# Patient Record
Sex: MALE | Race: WHITE | NOT HISPANIC OR LATINO | Employment: FULL TIME | URBAN - METROPOLITAN AREA
[De-identification: names, ages, dates, MRNs, and addresses within clinical notes are randomized per-mention and may not be internally consistent; named-entity substitution may affect disease eponyms.]

---

## 2020-03-05 ENCOUNTER — APPOINTMENT (OUTPATIENT)
Dept: CT IMAGING | Facility: CLINIC | Age: 45
End: 2020-03-05
Attending: EMERGENCY MEDICINE

## 2020-03-05 ENCOUNTER — HOSPITAL ENCOUNTER (EMERGENCY)
Facility: CLINIC | Age: 45
Discharge: HOME | End: 2020-03-05
Attending: EMERGENCY MEDICINE | Admitting: EMERGENCY MEDICINE

## 2020-03-05 VITALS
TEMPERATURE: 97.4 F | HEART RATE: 78 BPM | SYSTOLIC BLOOD PRESSURE: 124 MMHG | RESPIRATION RATE: 12 BRPM | OXYGEN SATURATION: 99 % | DIASTOLIC BLOOD PRESSURE: 83 MMHG | WEIGHT: 165.8 LBS

## 2020-03-05 DIAGNOSIS — K76.9 LIVER LESION: ICD-10-CM

## 2020-03-05 DIAGNOSIS — R10.84 GENERALIZED ABDOMINAL PAIN: ICD-10-CM

## 2020-03-05 LAB
ALBUMIN SERPL-MCNC: 4 G/DL (ref 3.4–5)
ALBUMIN UR-MCNC: NEGATIVE MG/DL
ALP SERPL-CCNC: 92 U/L (ref 40–150)
ALT SERPL W P-5'-P-CCNC: 27 U/L (ref 0–70)
ANION GAP SERPL CALCULATED.3IONS-SCNC: 4 MMOL/L (ref 3–14)
APPEARANCE UR: CLEAR
AST SERPL W P-5'-P-CCNC: 19 U/L (ref 0–45)
BASOPHILS # BLD AUTO: 0 10E9/L (ref 0–0.2)
BASOPHILS NFR BLD AUTO: 0.1 %
BILIRUB SERPL-MCNC: 0.8 MG/DL (ref 0.2–1.3)
BILIRUB UR QL STRIP: NEGATIVE
BUN SERPL-MCNC: 13 MG/DL (ref 7–30)
CALCIUM SERPL-MCNC: 8.8 MG/DL (ref 8.5–10.1)
CHLORIDE SERPL-SCNC: 110 MMOL/L (ref 94–109)
CO2 SERPL-SCNC: 28 MMOL/L (ref 20–32)
COLOR UR AUTO: ABNORMAL
CREAT SERPL-MCNC: 0.88 MG/DL (ref 0.66–1.25)
DIFFERENTIAL METHOD BLD: ABNORMAL
EOSINOPHIL # BLD AUTO: 0.1 10E9/L (ref 0–0.7)
EOSINOPHIL NFR BLD AUTO: 1.7 %
ERYTHROCYTE [DISTWIDTH] IN BLOOD BY AUTOMATED COUNT: 12.9 % (ref 10–15)
GFR SERPL CREATININE-BSD FRML MDRD: >90 ML/MIN/{1.73_M2}
GLUCOSE SERPL-MCNC: 87 MG/DL (ref 70–99)
GLUCOSE UR STRIP-MCNC: NEGATIVE MG/DL
HCT VFR BLD AUTO: 43.4 % (ref 40–53)
HGB BLD-MCNC: 15.1 G/DL (ref 13.3–17.7)
HGB UR QL STRIP: NEGATIVE
IMM GRANULOCYTES # BLD: 0 10E9/L (ref 0–0.4)
IMM GRANULOCYTES NFR BLD: 0.1 %
KETONES UR STRIP-MCNC: NEGATIVE MG/DL
LEUKOCYTE ESTERASE UR QL STRIP: NEGATIVE
LIPASE SERPL-CCNC: 165 U/L (ref 73–393)
LYMPHOCYTES # BLD AUTO: 2.2 10E9/L (ref 0.8–5.3)
LYMPHOCYTES NFR BLD AUTO: 26.2 %
MCH RBC QN AUTO: 33.6 PG (ref 26.5–33)
MCHC RBC AUTO-ENTMCNC: 34.8 G/DL (ref 31.5–36.5)
MCV RBC AUTO: 96 FL (ref 78–100)
MONOCYTES # BLD AUTO: 0.8 10E9/L (ref 0–1.3)
MONOCYTES NFR BLD AUTO: 9.7 %
MUCOUS THREADS #/AREA URNS LPF: PRESENT /LPF
NEUTROPHILS # BLD AUTO: 5.2 10E9/L (ref 1.6–8.3)
NEUTROPHILS NFR BLD AUTO: 62.2 %
NITRATE UR QL: NEGATIVE
NRBC # BLD AUTO: 0 10*3/UL
NRBC BLD AUTO-RTO: 0 /100
PH UR STRIP: 6.5 PH (ref 5–7)
PLATELET # BLD AUTO: 244 10E9/L (ref 150–450)
POTASSIUM SERPL-SCNC: 4 MMOL/L (ref 3.4–5.3)
PROT SERPL-MCNC: 7.3 G/DL (ref 6.8–8.8)
RBC # BLD AUTO: 4.5 10E12/L (ref 4.4–5.9)
RBC #/AREA URNS AUTO: 1 /HPF (ref 0–2)
SODIUM SERPL-SCNC: 142 MMOL/L (ref 133–144)
SOURCE: ABNORMAL
SP GR UR STRIP: >1.035 (ref 1–1.03)
UROBILINOGEN UR STRIP-MCNC: NORMAL MG/DL (ref 0–2)
WBC # BLD AUTO: 8.3 10E9/L (ref 4–11)
WBC #/AREA URNS AUTO: <1 /HPF (ref 0–5)

## 2020-03-05 PROCEDURE — 96375 TX/PRO/DX INJ NEW DRUG ADDON: CPT | Performed by: EMERGENCY MEDICINE

## 2020-03-05 PROCEDURE — 25000125 ZZHC RX 250: Performed by: EMERGENCY MEDICINE

## 2020-03-05 PROCEDURE — 25800030 ZZH RX IP 258 OP 636: Performed by: EMERGENCY MEDICINE

## 2020-03-05 PROCEDURE — 25000128 H RX IP 250 OP 636: Performed by: EMERGENCY MEDICINE

## 2020-03-05 PROCEDURE — 80053 COMPREHEN METABOLIC PANEL: CPT | Performed by: EMERGENCY MEDICINE

## 2020-03-05 PROCEDURE — 96361 HYDRATE IV INFUSION ADD-ON: CPT | Performed by: EMERGENCY MEDICINE

## 2020-03-05 PROCEDURE — 85025 COMPLETE CBC W/AUTO DIFF WBC: CPT | Performed by: EMERGENCY MEDICINE

## 2020-03-05 PROCEDURE — 96374 THER/PROPH/DIAG INJ IV PUSH: CPT | Mod: 59 | Performed by: EMERGENCY MEDICINE

## 2020-03-05 PROCEDURE — 74177 CT ABD & PELVIS W/CONTRAST: CPT

## 2020-03-05 PROCEDURE — 83690 ASSAY OF LIPASE: CPT | Performed by: EMERGENCY MEDICINE

## 2020-03-05 PROCEDURE — 99285 EMERGENCY DEPT VISIT HI MDM: CPT | Mod: Z6 | Performed by: EMERGENCY MEDICINE

## 2020-03-05 PROCEDURE — 25000132 ZZH RX MED GY IP 250 OP 250 PS 637: Performed by: EMERGENCY MEDICINE

## 2020-03-05 PROCEDURE — 25800030 ZZH RX IP 258 OP 636

## 2020-03-05 PROCEDURE — 99285 EMERGENCY DEPT VISIT HI MDM: CPT | Mod: 25 | Performed by: EMERGENCY MEDICINE

## 2020-03-05 PROCEDURE — 81001 URINALYSIS AUTO W/SCOPE: CPT | Performed by: EMERGENCY MEDICINE

## 2020-03-05 RX ORDER — IOPAMIDOL 755 MG/ML
100 INJECTION, SOLUTION INTRAVASCULAR ONCE
Status: COMPLETED | OUTPATIENT
Start: 2020-03-05 | End: 2020-03-05

## 2020-03-05 RX ORDER — ONDANSETRON 2 MG/ML
4 INJECTION INTRAMUSCULAR; INTRAVENOUS EVERY 30 MIN PRN
Status: DISCONTINUED | OUTPATIENT
Start: 2020-03-05 | End: 2020-03-05 | Stop reason: HOSPADM

## 2020-03-05 RX ORDER — SODIUM CHLORIDE 9 MG/ML
INJECTION, SOLUTION INTRAVENOUS
Status: COMPLETED
Start: 2020-03-05 | End: 2020-03-05

## 2020-03-05 RX ORDER — SODIUM CHLORIDE 9 MG/ML
1000 INJECTION, SOLUTION INTRAVENOUS CONTINUOUS
Status: DISCONTINUED | OUTPATIENT
Start: 2020-03-05 | End: 2020-03-05 | Stop reason: HOSPADM

## 2020-03-05 RX ORDER — KETOROLAC TROMETHAMINE 30 MG/ML
30 INJECTION, SOLUTION INTRAMUSCULAR; INTRAVENOUS ONCE
Status: COMPLETED | OUTPATIENT
Start: 2020-03-05 | End: 2020-03-05

## 2020-03-05 RX ORDER — MORPHINE SULFATE 4 MG/ML
4 INJECTION, SOLUTION INTRAMUSCULAR; INTRAVENOUS
Status: DISCONTINUED | OUTPATIENT
Start: 2020-03-05 | End: 2020-03-05 | Stop reason: HOSPADM

## 2020-03-05 RX ADMIN — MORPHINE SULFATE 4 MG: 4 INJECTION INTRAVENOUS at 10:20

## 2020-03-05 RX ADMIN — IOPAMIDOL 81 ML: 755 INJECTION, SOLUTION INTRAVENOUS at 11:19

## 2020-03-05 RX ADMIN — KETOROLAC TROMETHAMINE 30 MG: 30 INJECTION, SOLUTION INTRAMUSCULAR; INTRAVENOUS at 12:52

## 2020-03-05 RX ADMIN — SODIUM CHLORIDE 60 ML: 9 INJECTION, SOLUTION INTRAVENOUS at 11:20

## 2020-03-05 RX ADMIN — SODIUM CHLORIDE 1000 ML: 9 INJECTION, SOLUTION INTRAVENOUS at 10:26

## 2020-03-05 RX ADMIN — LIDOCAINE HYDROCHLORIDE 30 ML: 20 SOLUTION ORAL; TOPICAL at 12:50

## 2020-03-05 RX ADMIN — ONDANSETRON 4 MG: 2 INJECTION INTRAMUSCULAR; INTRAVENOUS at 10:20

## 2020-03-05 RX ADMIN — SODIUM CHLORIDE 1000 ML: 9 INJECTION, SOLUTION INTRAVENOUS at 09:59

## 2020-03-05 RX ADMIN — Medication 1000 ML: at 09:59

## 2020-03-05 ASSESSMENT — ENCOUNTER SYMPTOMS
HEMATURIA: 0
CONSTIPATION: 0
VOMITING: 0
CHILLS: 0
DYSURIA: 0
SHORTNESS OF BREATH: 0
FEVER: 0
APPETITE CHANGE: 1
DIARRHEA: 0
FATIGUE: 0
ABDOMINAL PAIN: 1
NAUSEA: 0
COUGH: 0

## 2020-03-05 NOTE — ED TRIAGE NOTES
"\"feels like something is going to explode on my right side\".  RUQ abdominal pain that has migrated towards center.  Pt has lack of sleep.  Described as sharp and hurts worse while sitting  "

## 2020-03-05 NOTE — ED PROVIDER NOTES
[facility] EMERGENCY DEPARTMENT ([facility])    3/05/20       History     Chief Complaint   Patient presents with     Abdominal Pain     RUQ pain     HPI  Lauri Gentile is a 44 year old male with no reported medical history who presents to the Emergency Department for right upper quadrant abdominal pain.  Reports that since Friday he has had worsening right upper quadrant abdominal pain that radiates medially to umbilicus and laterally to his flank.  Has not yet been evaluated for it.  States that pain is worse when sitting up/moving or when taking a deep breath. Feels there is an area of elevation to the right and superior to his belly button that is the source of his pain.  During any of these aggravating factors he states the pain is 10/10 in intensity and is tolerable when lying partially supine.  He states that over the past 3 days he has not been eating much.  He denies nausea, vomiting, or diarrhea.  Bowel movements have been normal.  Denies chest pain or cough.  Denies any alcohol use and denies a history of previous abdominal surgeries.  Patient states that he is otherwise healthy; does not take any other medication at this time. Denies any trauma or injury to the abdomen but does report having been elbowed while playing basketball in his lateral right chest.    I have reviewed the Medications, Allergies, Past Medical and Surgical History, and Social History in the [facility] system.  PAST MEDICAL HISTORY: History reviewed. No pertinent past medical history.    PAST SURGICAL HISTORY: No past surgical history on file.    FAMILY HISTORY: No family history on file.    SOCIAL HISTORY:   Social History     Tobacco Use     Smoking status: Not on file   Substance Use Topics     Alcohol use: Not on file       There are no discharge medications for this patient.       No Known Allergies     Review of Systems   Constitutional: Positive for appetite change. Negative for chills, fatigue and fever.    Respiratory: Negative for cough and shortness of breath.    Cardiovascular: Negative for chest pain.   Gastrointestinal: Positive for abdominal pain (RUQ). Negative for constipation, diarrhea, nausea and vomiting.   Genitourinary: Negative for dysuria and hematuria.   All other systems reviewed and are negative.      Physical Exam   BP: 123/75  Pulse: 78  Temp: 97.4  F (36.3  C)  Resp: 12  Weight: 75.2 kg (165 lb 12.8 oz)  SpO2: 98 %      Physical Exam  Vitals signs and nursing note reviewed.   Constitutional:       General: He is not in acute distress.     Appearance: He is well-developed.   HENT:      Head: Normocephalic and atraumatic.   Eyes:      General: No scleral icterus.     Conjunctiva/sclera: Conjunctivae normal.      Pupils: Pupils are equal, round, and reactive to light.   Neck:      Musculoskeletal: Neck supple.   Cardiovascular:      Rate and Rhythm: Normal rate and regular rhythm.      Heart sounds: Normal heart sounds.   Pulmonary:      Effort: Pulmonary effort is normal. No respiratory distress.      Breath sounds: Normal breath sounds. No wheezing.   Abdominal:      General: Abdomen is flat.      Palpations: Abdomen is soft.      Tenderness: There is no abdominal tenderness. Negative signs include Bustos's sign.      Hernia: No hernia is present.   Skin:     General: Skin is warm and dry.   Neurological:      Mental Status: He is alert and oriented to person, place, and time.      Cranial Nerves: No cranial nerve deficit.   Psychiatric:         Behavior: Behavior normal.         ED Course   10:11 AM  The patient was seen and examined by Niranjan Aguilar MD in Room ED07.       Procedures        CT Abdomen Pelvis w Contrast   Final Result   IMPRESSION:    1. No acute abnormality in the abdomen or pelvis. No cause for   right-sided pain is identified.   2. An enhancing 1.3 cm right hepatic lesion could represent a   hemangioma, however this lesion is not definitively characterized, and   is  considered indeterminate. Consider liver MRI for more definitive   characterization.      RADHA MONTGOMERY MD               No results found for this or any previous visit (from the past 24 hour(s)).  Medications   0.9% sodium chloride BOLUS (0 mLs Intravenous Stopped 3/5/20 1000)   iopamidol (ISOVUE-370) solution 100 mL (81 mLs Intravenous Given 3/5/20 1119)   sodium chloride 0.9 % bag 500mL for CT scan flush use (60 mLs As instructed Given 3/5/20 1120)   ketorolac (TORADOL) injection 30 mg (30 mg Intravenous Given 3/5/20 1252)   lidocaine (XYLOCAINE) 2 % 15 mL, alum & mag hydroxide-simethicone (MAALOX  ES) 15 mL GI Cocktail (30 mLs Oral Given 3/5/20 1250)             Assessments & Plan (with Medical Decision Making)   This is a 44-year-old male with no significant medical history. He has had 1 week of somewhat vague abdominal discomfort primarily in the right side and right upper abdomen. No constipation no diarrhea, no nausea or vomiting.  His work-up here shows completely normal labs including LFTs and lipase.  He has no urinary symptoms.  We did a CT of the abdomen which is normal except for small indeterminate liver lesion that is not related to his current symptoms.  Patient was frustrated that no etiology was found. I did try to reassure him that this means there is nothing serious more than likely causing the symptoms and I will recommend he follow-up with a GI provider.  He also should get a primary care provider, will give him the phone number to the [facility] clinic.    This part of the medical record was transcribed by Quynh Nunez Scribe.     I have reviewed the nursing notes.    I have reviewed the findings, diagnosis, plan and need for follow up with the patient.    There are no discharge medications for this patient.      Final diagnoses:   Generalized abdominal pain     I, Liang Delgado, am serving as a trained medical scribe to document services personally performed by Niranjan  MD Lauren, based on the provider's statements to me.   I, Niranjan Aguilar MD, was physically present and have reviewed and verified the accuracy of this note documented by Liang Delgado.     3/5/2020   [facility], [place], EMERGENCY DEPARTMENT     Niranjan Aguilar MD  03/09/20 0843

## 2020-03-05 NOTE — DISCHARGE INSTRUCTIONS
All of your tests are normal  If you continue to have discomfort I recommend you follow-up with a gastroenterologist, see the number below  Please make an appointment to follow up with GI Clinic (phone: [phone]) as soon as possible.  You should have a primary care provider, see below  Please make an appointment to follow up with [facility] (phone: [phone])     CT Abdomen Pelvis w Contrast   Final Result   IMPRESSION:    1. No acute abnormality in the abdomen or pelvis. No cause for   right-sided pain is identified.   2. An enhancing 1.3 cm right hepatic lesion could represent a   hemangioma, however this lesion is not definitively characterized, and   is considered indeterminate. Consider liver MRI for more definitive   characterization.      RADHA MONTGOMERY MD

## 2020-03-05 NOTE — ED AVS SNAPSHOT
[facility], [place], Emergency Department  [address]  [facility] [address]  Phone:  [phone]  Fax:  [phone]                                    Lauri Gentile   MRN: [id number]    Department:  [facility], Emergency Department   Date of Visit:  3/5/2020           After Visit Summary Signature Page    I have received my discharge instructions, and my questions have been answered. I have discussed any challenges I see with this plan with the nurse or doctor.    ..........................................................................................................................................  Patient/Patient Representative Signature      ..........................................................................................................................................  Patient Representative Print Name and Relationship to Patient    ..................................................               ................................................  Date                                   Time    ..........................................................................................................................................  Reviewed by Signature/Title    ...................................................              ..............................................  Date                                               Time          22EPIC Rev 08/18

## 2020-12-06 ENCOUNTER — HEALTH MAINTENANCE LETTER (OUTPATIENT)
Age: 45
End: 2020-12-06

## 2021-09-26 ENCOUNTER — HEALTH MAINTENANCE LETTER (OUTPATIENT)
Age: 46
End: 2021-09-26

## 2022-01-16 ENCOUNTER — HEALTH MAINTENANCE LETTER (OUTPATIENT)
Age: 47
End: 2022-01-16

## 2022-09-23 ENCOUNTER — ANCILLARY PROCEDURE (OUTPATIENT)
Dept: GENERAL RADIOLOGY | Facility: CLINIC | Age: 47
End: 2022-09-23
Attending: EMERGENCY MEDICINE

## 2022-09-23 ENCOUNTER — OFFICE VISIT (OUTPATIENT)
Dept: URGENT CARE | Facility: URGENT CARE | Age: 47
End: 2022-09-23
Payer: OTHER MISCELLANEOUS

## 2022-09-23 VITALS
HEIGHT: 75 IN | SYSTOLIC BLOOD PRESSURE: 111 MMHG | BODY MASS INDEX: 21.14 KG/M2 | WEIGHT: 170 LBS | HEART RATE: 69 BPM | TEMPERATURE: 98.4 F | DIASTOLIC BLOOD PRESSURE: 73 MMHG | OXYGEN SATURATION: 98 %

## 2022-09-23 DIAGNOSIS — S49.92XA SHOULDER INJURY, LEFT, INITIAL ENCOUNTER: ICD-10-CM

## 2022-09-23 DIAGNOSIS — S46.912A STRAIN OF LEFT SHOULDER, INITIAL ENCOUNTER: Primary | ICD-10-CM

## 2022-09-23 PROCEDURE — 73030 X-RAY EXAM OF SHOULDER: CPT | Mod: TC | Performed by: RADIOLOGY

## 2022-09-23 PROCEDURE — 99204 OFFICE O/P NEW MOD 45 MIN: CPT | Performed by: EMERGENCY MEDICINE

## 2022-09-26 NOTE — TELEPHONE ENCOUNTER
DIAGNOSIS: Strain of left shoulder, initial encounter \ Dr Carrera\ self   APPOINTMENT DATE: 9.27.22   NOTES STATUS DETAILS   OFFICE NOTE from other specialist Internal 9.23.22 [facility]   XRAYS (IMAGES & REPORTS) Internal 9.23.22 L shoulder

## 2022-09-27 ENCOUNTER — OFFICE VISIT (OUTPATIENT)
Dept: ORTHOPEDICS | Facility: CLINIC | Age: 47
End: 2022-09-27
Attending: EMERGENCY MEDICINE
Payer: OTHER MISCELLANEOUS

## 2022-09-27 ENCOUNTER — TELEPHONE (OUTPATIENT)
Dept: ORTHOPEDICS | Facility: CLINIC | Age: 47
End: 2022-09-27

## 2022-09-27 ENCOUNTER — PRE VISIT (OUTPATIENT)
Dept: ORTHOPEDICS | Facility: CLINIC | Age: 47
End: 2022-09-27

## 2022-09-27 VITALS — WEIGHT: 170 LBS | BODY MASS INDEX: 21.14 KG/M2 | HEIGHT: 75 IN

## 2022-09-27 DIAGNOSIS — S46.912A STRAIN OF LEFT SHOULDER, INITIAL ENCOUNTER: ICD-10-CM

## 2022-09-27 PROCEDURE — 99204 OFFICE O/P NEW MOD 45 MIN: CPT | Performed by: FAMILY MEDICINE

## 2022-09-27 RX ORDER — MELOXICAM 15 MG/1
15 TABLET ORAL DAILY PRN
Qty: 30 TABLET | Refills: 0 | Status: SHIPPED | OUTPATIENT
Start: 2022-09-27

## 2022-09-27 NOTE — PROGRESS NOTES
"CHIEF COMPLAINT:  Consult (Left shoulder)       HISTORY OF PRESENT ILLNESS  Mr. Gentile is a pleasant 46 year old male who presents to clinic today with left shoulder pain.  Mikhail injured his shoulder on September 23, he was lifting an object at work when he felt a pop in his shoulder, feeling sharp shooting pain all the way down his arm.  He was seen at urgent care where he had a reassuring x-ray, unfortunately his shoulder pain is getting worse and is overall not improving since the incident.  He cannot get his arm behind his back, he feels pain whenever he lifts in any direction.  He denies any weakness in his arm.  This is his dominant arm.      Additional history: as documented    MEDICAL HISTORY  There is no problem list on file for this patient.      No current outpatient medications on file.       No Known Allergies    No family history on file.    Additional medical/Social/Surgical histories reviewed in [facility] and updated as appropriate.        PHYSICAL EXAM  Ht 1.905 m (6' 3\")   Wt 77.1 kg (170 lb)   BMI 21.25 kg/m    General  - normal appearance, in no obvious distress  Musculoskeletal - left shoulder  - inspection: normal bone and joint alignment, no obvious deformity, no scapular winging, no AC step-off  - palpation: mildly tender RC insertion, normal clavicle, non-tender AC  - ROM:  Painful initiation of external rotation, cannot reach behind his back, painful at 90 degrees flexion  - strength: 5/5  strength, 5/5 in all shoulder planes  - special tests:  (-) Speed's  (+) Neer  (+) Hawkin's  (+) Kenia's  Neuro  - no sensory or motor deficit, grossly normal coordination, normal muscle tone               ASSESSMENT & PLAN  Mr. Gentile is a 46 year old male who presents to clinic today with left shoulder pain after a injury at work.    I reviewed his x-ray in the room with him, this shows no obvious acute or chronic issues.    Given his level of disability and level of pain I do think a MRI is " reasonable, I am ordering this to the performed at his earliest convenience.  I am also referring to physical therapy for mobilization and for modalities, in addition I prescribed him meloxicam to start on an as-needed basis.    I will get in touch with him with his MRI results.    It was a pleasure seeing Mikhail today.    Tavo Syed DO, [facility]  Primary Care Sports Medicine      This note was constructed using Dragon dictation software, please excuse any minor errors in spelling, grammar, or syntax.

## 2022-09-27 NOTE — TELEPHONE ENCOUNTER
[facility] Call Center    Phone Message    May a detailed message be left on voicemail: yes     Reason for Call: Other: Patient is requesting order for MRI be sent to [facility].     Action Taken: Message routed to:  [facility] sports    Travel Screening: Not Applicable

## 2022-09-27 NOTE — LETTER
"  9/27/2022      RE: Lauri Gentile  [address]  [facility] [address]     Dear Colleague,     Thank you for referring your patient, Lauri Gentile, to the [facility] SP[facility]TS [facility]IC[facility]IC [place]. Please see a copy of my visit note below.    CHIEF COMPLAINT:  Consult (Left shoulder)       HISTORY OF PRESENT ILLNESS  Mr. Gentile is a pleasant 46 year old male who presents to clinic today with left shoulder pain.  Mikhail injured his shoulder on September 23, he was lifting an object at work when he felt a pop in his shoulder, feeling sharp shooting pain all the way down his arm.  He was seen at urgent care where he had a reassuring x-ray, unfortunately his shoulder pain is getting worse and is overall not improving since the incident.  He cannot get his arm behind his back, he feels pain whenever he lifts in any direction.  He denies any weakness in his arm.  This is his dominant arm.      Additional history: as documented    MEDICAL HISTORY  There is no problem list on file for this patient.      No current outpatient medications on file.       No Known Allergies    No family history on file.    Additional medical/Social/Surgical histories reviewed in [facility] and updated as appropriate.        PHYSICAL EXAM  Ht 1.905 m (6' 3\")   Wt 77.1 kg (170 lb)   BMI 21.25 kg/m    General  - normal appearance, in no obvious distress  Musculoskeletal - left shoulder  - inspection: normal bone and joint alignment, no obvious deformity, no scapular winging, no AC step-off  - palpation: mildly tender RC insertion, normal clavicle, non-tender AC  - ROM:  Painful initiation of external rotation, cannot reach behind his back, painful at 90 degrees flexion  - strength: 5/5  strength, 5/5 in all shoulder planes  - special tests:  (-) Speed's  (+) Neer  (+) Hawkin's  (+) Kenia's  Neuro  - no sensory or motor deficit, grossly normal coordination, normal muscle tone               ASSESSMENT & PLAN  Mr. Gentile " is a 46 year old male who presents to clinic today with left shoulder pain after a injury at work.    I reviewed his x-ray in the room with him, this shows no obvious acute or chronic issues.    Given his level of disability and level of pain I do think a MRI is reasonable, I am ordering this to the performed at his earliest convenience.  I am also referring to physical therapy for mobilization and for modalities, in addition I prescribed him meloxicam to start on an as-needed basis.    I will get in touch with him with his MRI results.    It was a pleasure seeing Mikhail today.    Tavo Syed DO, [facility]  Primary Care Sports Medicine      This note was constructed using Dragon dictation software, please excuse any minor errors in spelling, grammar, or syntax.

## 2022-09-27 NOTE — LETTER
September 27, 2022      Lauri Gentile  [address]  [facility] [address]              Dear  or ,    Mr. Lauri Bowles is under the care of our orthopedic office for a shoulder injury.  Please allow him to remain out of work due to his injury until Monday, October 3, 2022.  This restriction may be extended based upon further testing and treatment, or based upon his response to treatment.    Please call with questions or if clarification is necessary.      Sincerely,              Tavo Syed DO [facility]

## 2022-09-28 ENCOUNTER — TELEPHONE (OUTPATIENT)
Dept: ORTHOPEDICS | Facility: CLINIC | Age: 47
End: 2022-09-28

## 2022-09-28 NOTE — TELEPHONE ENCOUNTER
[facility] Call Center    Phone Message    May a detailed message be left on voicemail: yes     Reason for Call: Other: Francie with [employer] needing to get records for the MRI referral, please send to fax# [phone] claim # [id number].  Any questions call Francie at [phone]     Action Taken: Other:  sports med    Travel Screening: Not Applicable

## 2022-10-03 ENCOUNTER — TELEPHONE (OUTPATIENT)
Dept: ORTHOPEDICS | Facility: CLINIC | Age: 47
End: 2022-10-03

## 2022-10-03 NOTE — TELEPHONE ENCOUNTER
[facility] Call Center    Phone Message    May a detailed message be left on voicemail: yes     Reason for Call: Other: Asking for call back with results of MRI of left shoulder.  And pt also needing letter for work stating he is unable to work.  PT can be reached at [phone]     Action Taken: Message routed to:  [facility] Dr. Tavo Syed     Travel Screening: Not Applicable

## 2022-10-04 ENCOUNTER — MYC MEDICAL ADVICE (OUTPATIENT)
Dept: ORTHOPEDICS | Facility: CLINIC | Age: 47
End: 2022-10-04

## 2022-10-04 NOTE — TELEPHONE ENCOUNTER
Patient calling back regarding MRI results. Patient is unable to return back to work, states he's unable to use his left arm. Needs a note for work. Would like it if someone can give him a call asap as he's missing work. Okay to leave detail message.

## 2022-10-05 ENCOUNTER — TELEPHONE (OUTPATIENT)
Dept: ORTHOPEDICS | Facility: CLINIC | Age: 47
End: 2022-10-05

## 2022-10-05 NOTE — TELEPHONE ENCOUNTER
[facility] Call Center    Phone Message    May a detailed message be left on voicemail: no     Reason for Call: Form or Letter   Type or form/letter needing completion: work note for missing work  Provider: Dr Syed  Date form needed: ASAP  Once completed: send to [employer]      Action Taken: Message routed to:  [facility]    Travel Screening: Not Applicable

## 2022-10-05 NOTE — LETTER
[facility] SPORTS MEDICINE CLINIC [address]  [address] [facility] [address]  [phone]        October 6, 2022    Regarding:  Lauri Gentile  [address]  [facility] [address]              To Whom It May Concern;    Lauri Gentile was seen in our clinic for an orthopedic injury. Due to the extent of the injury please excuse him from work from October 3rd-October 20th. Thank you for understanding his needs during this time.           Sincerely,        Tavo Syed, DO

## 2022-10-19 ENCOUNTER — OFFICE VISIT (OUTPATIENT)
Dept: ORTHOPEDICS | Facility: CLINIC | Age: 47
End: 2022-10-19
Payer: OTHER MISCELLANEOUS

## 2022-10-19 VITALS — HEIGHT: 75 IN | WEIGHT: 170 LBS | BODY MASS INDEX: 21.14 KG/M2

## 2022-10-19 DIAGNOSIS — S46.912A STRAIN OF LEFT SHOULDER, INITIAL ENCOUNTER: Primary | ICD-10-CM

## 2022-10-19 PROCEDURE — 99213 OFFICE O/P EST LOW 20 MIN: CPT | Mod: 25 | Performed by: FAMILY MEDICINE

## 2022-10-19 PROCEDURE — 20611 DRAIN/INJ JOINT/BURSA W/US: CPT | Mod: LT | Performed by: FAMILY MEDICINE

## 2022-10-19 RX ORDER — METHYLPREDNISOLONE ACETATE 80 MG/ML
80 INJECTION, SUSPENSION INTRA-ARTICULAR; INTRALESIONAL; INTRAMUSCULAR; SOFT TISSUE
Status: SHIPPED | OUTPATIENT
Start: 2022-10-19

## 2022-10-19 RX ORDER — LIDOCAINE HYDROCHLORIDE 10 MG/ML
4 INJECTION, SOLUTION EPIDURAL; INFILTRATION; INTRACAUDAL; PERINEURAL
Status: SHIPPED | OUTPATIENT
Start: 2022-10-19

## 2022-10-19 RX ADMIN — METHYLPREDNISOLONE ACETATE 80 MG: 80 INJECTION, SUSPENSION INTRA-ARTICULAR; INTRALESIONAL; INTRAMUSCULAR; SOFT TISSUE at 13:10

## 2022-10-19 RX ADMIN — LIDOCAINE HYDROCHLORIDE 4 ML: 10 INJECTION, SOLUTION EPIDURAL; INFILTRATION; INTRACAUDAL; PERINEURAL at 13:10

## 2022-10-19 NOTE — LETTER
October 19, 2022      Lauri Gentile  [address]  [place] [address]              Dear  or ,    Mr. Lauri Gentile is under the care of our orthopedic office for a left shoulder condition.  Part of his care includes treatment that was rendered today, please allow him to remain off of work throughout the rest of the week for recovery.  He can return, unrestricted, on October 24, 2022.    Please call with questions or concerns, or if clarification is necessary.      Sincerely,              Tavo Syed, DO [facility]

## 2022-10-19 NOTE — NURSING NOTE
[address] [address]  Dept: [phone]  ______________________________________________________________________________    Patient: Lauri Gentile   : 1975   MRN: [id number]   2022    INVASIVE PROCEDURE SAFETY CHECKLIST    Date: 2022   Procedure: Left glenohumeral joint injection with depo and USG  Patient Name: Lauri Gentile  MRN: [id number]  YOB: 1975    Action: Complete sections as appropriate. Any discrepancy results in a HARD COPY until resolved.     PRE PROCEDURE:  Patient ID verified with 2 identifiers (name and  or MRN): Yes  Procedure and site verified with patient/designee (when able): Yes  Accurate consent documentation in medical record: Yes  H&P (or appropriate assessment) documented in medical record: Yes  H&P must be up to 20 days prior to procedure and updates within 24 hours of procedure as applicable: NA  Relevant diagnostic and radiology test results appropriately labeled and displayed as applicable: NA  Procedure site(s) marked with provider initials: NA    TIMEOUT:  Time-Out performed immediately prior to starting procedure, including verbal and active participation of all team members addressing the following:Yes  * Correct patient identify  * Confirmed that the correct side and site are marked  * An accurate procedure consent form  * Agreement on the procedure to be done  * Correct patient position  * Relevant images and results are properly labeled and appropriately displayed  * The need to administer antibiotics or fluids for irrigation purposes during the procedure as applicable   * Safety precautions based on patient history or medication use    DURING PROCEDURE: Verification of correct person, site, and procedures any time the responsibility for care of the patient is transferred to another member of the care team.       Prior to injection, verified patient  identity using patient's name and date of birth.  Due to injection administration, patient instructed to remain in clinic for 15 minutes  afterwards, and to report any adverse reaction to me immediately.    Joint injection was performed.      Lido  Drug Amount Wasted:  Yes: 1 mg/ml   Vial/Syringe: Single dose vial  Expiration Date:  06/01/2025    Depo  Drug Amount Wasted: No  Vial/Syringe: Single dose vial  Expiration Date: 06/01/2024    Angela Godwin, ATC  October 19, 2022

## 2022-10-19 NOTE — PROGRESS NOTES
"ESTABLISHED PATIENT FOLLOW-UP:  Injections of the Left Shoulder       HISTORY OF PRESENT ILLNESS  Mr. Gentile is a pleasant 47 year old year old male who presents to clinic today for follow-up of left shoulder. Patient explains since his last visit, there has been no change in his shoulder. He continues to have limited ROM, decreased strength and pain will radiate into his left hand.    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     Date of injury/onset: 9/23/22  Date last seen: 9/2/22  Following Therapeutic Plan: Yes, he will use a sling for majority of the day, rest and medication.   Pain: no change  Function: no change  Interval History: Patient reports that he used Mobic prescription that did not help treat his symptoms.      MEDICAL HISTORY  There is no problem list on file for this patient.      Current Outpatient Medications   Medication Sig Dispense Refill     meloxicam (MOBIC) 15 MG tablet Take 1 tablet (15 mg) by mouth daily as needed for moderate pain 30 tablet 0       No Known Allergies    No family history on file.    Additional medical/Social/Surgical histories reviewed in [facility] and updated as appropriate.       PHYSICAL EXAM  Ht 1.905 m (6' 3\")   Wt 77.1 kg (170 lb)   BMI 21.25 kg/m      General  - normal appearance, in no obvious distress  Musculoskeletal -left shoulder  - inspection: normal bone and joint alignment, no obvious deformity, no scapular winging, no AC step-off  - palpation: no bony or soft tissue tenderness, normal clavicle, non-tender AC  - ROM: painful and limited at 90 deg flexion  - strength: 5/5  strength, 5/5 in all shoulder planes  Neuro  - no sensory or motor deficit, grossly normal coordination, normal muscle tone  Skin  - no ecchymosis, erythema, warmth, or induration, no obvious rash  Psych  - interactive, appropriate, normal mood and affect        ASSESSMENT & PLAN  Mr. Gentile is a 47 year old year old male who presents to clinic today following up " with left shoulder pain.    I reviewed his MRI in the room with him, this does reveal findings consistent with adhesive capsulitis.    Through shared decision making we did decide to inject his shoulder today.    I also wrote Mikhail a letter extending his leave until this coming Monday.    If his symptoms do not improve whatsoever Mikhail should get a hold of us, if this is the case we may escalate his care.    It was a pleasure seeing Lauri.    Tavo Ojeda DO, [facility]  Primary Care Sports Medicine      Large Joint Injection: L glenohumeral joint    Date/Time: 10/19/2022 1:10 PM  Performed by: Tavo Syed DO  Authorized by: Tavo Syed DO     Indications:  Pain and osteoarthritis  Needle Size:  22 G  Guidance: ultrasound    Approach:  Anterior  Location:  Shoulder      Site:  L glenohumeral joint  Medications:  80 mg methylPREDNISolone 80 MG/ML; 4 mL lidocaine (PF) 1 %  Outcome:  Tolerated well, no immediate complications  Procedure discussed: discussed risks, benefits, and alternatives    Consent Given by:  Patient  Timeout: timeout called immediately prior to procedure    Prep: patient was prepped and draped in usual sterile fashion       Glenohumeral Injection - Ultrasound Guided  The patient was informed of the risks and the benefits of the procedure and a written consent was signed.  The patient s left shoulder was prepped with chlorhexidine in sterile fashion.   80 mg of methylprednisolone suspension was drawn up into a 5 mL syringe with 3 mL of 1% lidocaine w/o Epi.  Injection was performed using sterile technique.  Under ultrasound guidance a 3.5-inch 22-gauge needle was used to enter the glenohumeral joint.  Posterior approach was used with the patient in lateral recumbent position, arm in neutral position at the side.  Needle placement was visualized and documented with ultrasound.  Ultrasound visualization was necessary due to the small joint space entered.  Injection performed long axis to the  probe.  Injection solution visualized within the joint space.  Images were permanently stored for the patient's record.  There were no complications. The patient tolerated the procedure well. There was negligible bleeding.   Therapy scheduled to follow for mobilization.

## 2022-10-19 NOTE — LETTER
"  10/19/2022    RE: Lauri Gentile  [address]  [facility] [address]     Dear Colleague,    Thank you for referring your patient, Lauri Gentile, to the [facility] [place]. Please see a copy of my visit note below.    ESTABLISHED PATIENT FOLLOW-UP:  Injections of the Left Shoulder       HISTORY OF PRESENT ILLNESS  Mr. Gentile is a pleasant 47 year old year old male who presents to clinic today for follow-up of left shoulder. Patient explains since his last visit, there has been no change in his shoulder. He continues to have limited ROM, decreased strength and pain will radiate into his left hand.    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     Date of injury/onset: 9/23/22  Date last seen: 9/2/22  Following Therapeutic Plan: Yes, he will use a sling for majority of the day, rest and medication.   Pain: no change  Function: no change  Interval History: Patient reports that he used Mobic prescription that did not help treat his symptoms.      MEDICAL HISTORY  There is no problem list on file for this patient.      Current Outpatient Medications   Medication Sig Dispense Refill     meloxicam (MOBIC) 15 MG tablet Take 1 tablet (15 mg) by mouth daily as needed for moderate pain 30 tablet 0       No Known Allergies    No family history on file.    Additional medical/Social/Surgical histories reviewed in [facility] and updated as appropriate.       PHYSICAL EXAM  Ht 1.905 m (6' 3\")   Wt 77.1 kg (170 lb)   BMI 21.25 kg/m      General  - normal appearance, in no obvious distress  Musculoskeletal -left shoulder  - inspection: normal bone and joint alignment, no obvious deformity, no scapular winging, no AC step-off  - palpation: no bony or soft tissue tenderness, normal clavicle, non-tender AC  - ROM: painful and limited at 90 deg flexion  - strength: 5/5  strength, 5/5 in all shoulder planes  Neuro  - no sensory or motor deficit, grossly normal " coordination, normal muscle tone  Skin  - no ecchymosis, erythema, warmth, or induration, no obvious rash  Psych  - interactive, appropriate, normal mood and affect        ASSESSMENT & PLAN  Mr. Gentile is a 47 year old year old male who presents to clinic today following up with left shoulder pain.    I reviewed his MRI in the room with him, this does reveal findings consistent with adhesive capsulitis.    Through shared decision making we did decide to inject his shoulder today.    I also wrote Mikhail a letter extending his leave until this coming Monday.    If his symptoms do not improve whatsoever Mikhail should get a hold of us, if this is the case we may escalate his care.    It was a pleasure seeing Lauri.    Tavo Ojeda DO, [facility]  Primary Care Sports Medicine      Large Joint Injection: L glenohumeral joint    Date/Time: 10/19/2022 1:10 PM  Performed by: Tavo Syed DO  Authorized by: Tavo Syed DO     Indications:  Pain and osteoarthritis  Needle Size:  22 G  Guidance: ultrasound    Approach:  Anterior  Location:  Shoulder      Site:  L glenohumeral joint  Medications:  80 mg methylPREDNISolone 80 MG/ML; 4 mL lidocaine (PF) 1 %  Outcome:  Tolerated well, no immediate complications  Procedure discussed: discussed risks, benefits, and alternatives    Consent Given by:  Patient  Timeout: timeout called immediately prior to procedure    Prep: patient was prepped and draped in usual sterile fashion       Glenohumeral Injection - Ultrasound Guided  The patient was informed of the risks and the benefits of the procedure and a written consent was signed.  The patient s left shoulder was prepped with chlorhexidine in sterile fashion.   80 mg of methylprednisolone suspension was drawn up into a 5 mL syringe with 3 mL of 1% lidocaine w/o Epi.  Injection was performed using sterile technique.  Under ultrasound guidance a 3.5-inch 22-gauge needle was used to enter the glenohumeral joint.  Posterior approach  was used with the patient in lateral recumbent position, arm in neutral position at the side.  Needle placement was visualized and documented with ultrasound.  Ultrasound visualization was necessary due to the small joint space entered.  Injection performed long axis to the probe.  Injection solution visualized within the joint space.  Images were permanently stored for the patient's record.  There were no complications. The patient tolerated the procedure well. There was negligible bleeding.   Therapy scheduled to follow for mobilization.    Again, thank you for allowing me to participate in the care of your patient.      Sincerely,    Tavo Syed DO

## 2023-01-08 ENCOUNTER — HEALTH MAINTENANCE LETTER (OUTPATIENT)
Age: 48
End: 2023-01-08

## 2023-01-27 ENCOUNTER — DOCUMENTATION ONLY (OUTPATIENT)
Dept: ORTHOPEDICS | Facility: CLINIC | Age: 48
End: 2023-01-27

## 2023-01-27 NOTE — PROGRESS NOTES
Received Completed forms Yes   Faxed Forms Faxed To: [employer]  Fax Number: [phone]   Sent to HIM (Date) 1/27/23

## 2023-02-28 ENCOUNTER — OFFICE VISIT (OUTPATIENT)
Dept: ORTHOPEDICS | Facility: CLINIC | Age: 48
End: 2023-02-28
Payer: OTHER MISCELLANEOUS

## 2023-02-28 DIAGNOSIS — M75.02 ADHESIVE CAPSULITIS OF LEFT SHOULDER: Primary | ICD-10-CM

## 2023-02-28 PROCEDURE — 99213 OFFICE O/P EST LOW 20 MIN: CPT | Performed by: FAMILY MEDICINE

## 2023-02-28 NOTE — PROGRESS NOTES
Subjective:   Mr. Gentile is a 47 year old male who returns to clinic for left shoulder pain.     Date of injury: 9/23/22  Date last seen: 10/19/2022  Following Therapeutic Plan: Yes   Pain: Improving  Function: Improving  Interval History: Today, the patient reports that since his last visit on 10/19/22 he received a left GH injection and has had no pain since. He has had improved ROM. 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         Exam:   General  - normal appearance, in no obvious distress  Musculoskeletal - left shoulder  - inspection: normal bone and joint alignment, no obvious deformity, no scapular winging, no AC step-off  - palpation: no bony or soft tissue tenderness, normal clavicle, non-tender AC  - ROM:  180 deg flexion   45 deg extension   150 deg abduction   90 deg ER   70 deg IR  - strength: 5/5  strength, 5/5 in all shoulder planes  - special tests:  (-) Speed's  (-) Neer  (-) Hawkin's  (-) Kenia  (-) apprehension  Neuro  - no sensory or motor deficit, grossly normal coordination, normal muscle tone         Assessment/Plan:   Mr. Bowles is a 47-year-old gentleman following up with adhesive capsulitis of the left shoulder.    I am happy that Mikhail is feeling better, given his improvement I do think it would be reasonable to clear him from his work-related injury as he does seem to be at 100% maximal medical improvement.    We did discuss that if this does return, or if he is otherwise concerned we should follow-up.  If he continues to do well we can follow-up as needed for this and other issues.    It was a pleasure seeing Mikhail.    Douglas Syed, DO [facility]

## 2023-02-28 NOTE — LETTER
2/28/2023    RE: Lauri Gentile  [address]  [facility] [address]     Dear Colleague,    Thank you for referring your patient, Lauri Gentile, to the [facility] [place]. Please see a copy of my visit note below.     Subjective:   Mr. Gentile is a 47 year old male who returns to clinic for left shoulder pain.     Date of injury: 9/23/22  Date last seen: 10/19/2022  Following Therapeutic Plan: Yes   Pain: Improving  Function: Improving  Interval History: Today, the patient reports that since his last visit on 10/19/22 he received a left GH injection and has had no pain since. He has had improved ROM. 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         Exam:   General  - normal appearance, in no obvious distress  Musculoskeletal - left shoulder  - inspection: normal bone and joint alignment, no obvious deformity, no scapular winging, no AC step-off  - palpation: no bony or soft tissue tenderness, normal clavicle, non-tender AC  - ROM:  180 deg flexion   45 deg extension   150 deg abduction   90 deg ER   70 deg IR  - strength: 5/5  strength, 5/5 in all shoulder planes  - special tests:  (-) Speed's  (-) Neer  (-) Hawkin's  (-) Kenia  (-) apprehension  Neuro  - no sensory or motor deficit, grossly normal coordination, normal muscle tone         Assessment/Plan:   Mr. Bowles is a 47-year-old gentleman following up with adhesive capsulitis of the left shoulder.    I am happy that Mikhail is feeling better, given his improvement I do think it would be reasonable to clear him from his work-related injury as he does seem to be at 100% maximal medical improvement.    We did discuss that if this does return, or if he is otherwise concerned we should follow-up.  If he continues to do well we can follow-up as needed for this and other issues.    It was a pleasure seeing Mikhail.    Douglas Syed, DO CAQ[facility]

## 2023-03-09 ENCOUNTER — DOCUMENTATION ONLY (OUTPATIENT)
Dept: ORTHOPEDICS | Facility: CLINIC | Age: 48
End: 2023-03-09

## 2023-03-09 NOTE — PROGRESS NOTES
Received Completed forms Yes   Faxed Forms Faxed To: [place] [employer]  Fax Number: [phone]   Sent to HIM (Date) 3/8/23

## 2023-04-23 ENCOUNTER — HEALTH MAINTENANCE LETTER (OUTPATIENT)
Age: 48
End: 2023-04-23

## 2024-06-29 ENCOUNTER — HEALTH MAINTENANCE LETTER (OUTPATIENT)
Age: 49
End: 2024-06-29

## 2025-07-07 NOTE — PROGRESS NOTES
Assessment & Plan     Diagnosis:    (F16.496T) Strain of left shoulder, initial encounter  (primary encounter diagnosis)      Medical Decision Making:  Lauri Gentile is a 46 year old male who presents for evaluation of left shoulder pain. Differential diagnosis includes clavicular or scapular fracture, AC joint or sternoclavicular joint pathology, rotator cuff injury, shoulder dislocation, humoral fracture, rheumatological disorder, metastasis, or referred pain from the gallbladder, heart, or diaphragm.      X-ray demonstrates no signs of fracture or dislocation.  There is no sign of vascular or neurologic compromise and full range of motion is intact. There is no history of recent trauma and no point tenderness over bony prominences or over the joints. There are no other findings concerning for the above listed differential, including no chest pain, shortness of breath, nausea, vomiting, diaphoresis or other concerning cardiac symptoms.  Patient is a  and I suspect overuse injury.    Given the negative workup, pain is most likely muscular or soft tissue in origin and patient was provided with a sling anti-inflammatories and pain medication for a short time. Follow up with primary care doctor or orthopedics as prescribed.    Patient voices understanding and agreement with the plan including reasons to go to the ER immediately as well as to be seen by a more consistent care-giver, such as their PCP, if the symptoms persist more than 3 days.       Ghassan Carrera PA-C  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      Patient experienced immediate pain, was able to bear weight directly after injury, no deformity was noted by the patient  Course of symptoms is same.    Severity moderate  Current and Associated symptoms: Pain and Decreased range of motion due to pain.  Denies  Swelling, Bruising, Warmth and Redness  Aggravating Factors: weight-bearing and extending arm above head  Therapies to improve symptoms include: none    Patient denies any numbness or weakness in the arm, neck pain, recent head or neck injuries or other concerns.     Review of Systems    See HPI    Objective      Vitals: /73 (BP Location: Right arm, Patient Position: Sitting, Cuff Size: Adult Regular)   Pulse 69   Temp 98.4  F (36.9  C) (Temporal)   Ht 1.905 m (6' 3\")   Wt 77.1 kg (170 lb)   SpO2 98%   BMI 21.25 kg/m    Resp: 16    Patient Vitals for the past 24 hrs:   BP Temp Temp src Pulse SpO2 Height Weight   09/23/22 1357 111/73 98.4  F (36.9  C) Temporal 69 98 % 1.905 m (6' 3\") 77.1 kg (170 lb)       Vital signs reviewed by: Ghassan Carrera PA-C    Physical Exam   Constitutional: Patient is alert and cooperative. Mild acute distress.  Neck: Normal range of motion.  No meningismus.  No midline C-spine tenderness to palpation.  Mouth: Mucous membranes are moist. Normal tongue and tonsil. Posterior oropharynx is clear.  Eyes: Conjunctivae, EOMI and lids are normal. PERRL.  Cardiovascular: Regular rate and rhythm.  Radial pulse 2+ in the left upper extremity.  Pulmonary/Chest: Lungs are clear to auscultation throughout. Effort normal. No respiratory distress. No wheezes, rales or rhonchi.  Neurological: Alert and oriented x3.  Strength and sensation are intact and symmetric in the bilateral upper extremities.   MSK: No point tenderness at the " shoulder, clavicle or humerus.  Range of motion limited secondary to pain with extension above the head.  Negative Neer's test. No skin tenting or deformity.  No erythema, warmth, or crepitus at the shoulder joint.  Skin: No rash noted on visualized skin.  Psychiatric:The patient has a normal mood and affect.     Labs/Imaging:  Results for orders placed or performed in visit on 09/23/22   XR Shoulder Left G/E 3 Views     Status: None    Narrative    XR SHOULDER LEFT G/E 3 VIEWS 9/23/2022 2:24 PM    HISTORY: Shoulder injury, left, initial encounter    COMPARISON: None.      Impression    IMPRESSION: No fracture or dislocation. No degenerative changes.    PEDRO SARAVIA MD         SYSTEM ID:  [id number]     Reading per radiology        Ghassan Carrera PA-C, September 23, 2022

## 2025-07-13 ENCOUNTER — HEALTH MAINTENANCE LETTER (OUTPATIENT)
Age: 50
End: 2025-07-13

## (undated) RX ORDER — METHYLPREDNISOLONE ACETATE 80 MG/ML
INJECTION, SUSPENSION INTRA-ARTICULAR; INTRALESIONAL; INTRAMUSCULAR; SOFT TISSUE
Status: DISPENSED
Start: 2022-10-19

## (undated) RX ORDER — LIDOCAINE HYDROCHLORIDE 10 MG/ML
INJECTION, SOLUTION EPIDURAL; INFILTRATION; INTRACAUDAL; PERINEURAL
Status: DISPENSED
Start: 2022-10-19